# Patient Record
Sex: FEMALE | Race: WHITE | Employment: STUDENT | URBAN - NONMETROPOLITAN AREA
[De-identification: names, ages, dates, MRNs, and addresses within clinical notes are randomized per-mention and may not be internally consistent; named-entity substitution may affect disease eponyms.]

---

## 2017-04-03 ENCOUNTER — TELEPHONE (OUTPATIENT)
Dept: FAMILY MEDICINE CLINIC | Age: 22
End: 2017-04-03

## 2017-09-28 ENCOUNTER — HOSPITAL ENCOUNTER (EMERGENCY)
Age: 22
Discharge: HOME | End: 2017-09-28
Payer: COMMERCIAL

## 2017-09-28 VITALS
TEMPERATURE: 98.3 F | WEIGHT: 172 LBS | DIASTOLIC BLOOD PRESSURE: 84 MMHG | RESPIRATION RATE: 18 BRPM | BODY MASS INDEX: 31.65 KG/M2 | HEART RATE: 97 BPM | SYSTOLIC BLOOD PRESSURE: 126 MMHG | OXYGEN SATURATION: 100 % | HEIGHT: 62 IN

## 2017-09-28 DIAGNOSIS — R51.9 ACUTE NONINTRACTABLE HEADACHE, UNSPECIFIED HEADACHE TYPE: ICD-10-CM

## 2017-09-28 DIAGNOSIS — J06.9 VIRAL URI: Primary | ICD-10-CM

## 2017-09-28 PROCEDURE — 99213 OFFICE O/P EST LOW 20 MIN: CPT | Performed by: NURSE PRACTITIONER

## 2017-09-28 PROCEDURE — 6360000002 HC RX W HCPCS: Performed by: NURSE PRACTITIONER

## 2017-09-28 PROCEDURE — 99214 OFFICE O/P EST MOD 30 MIN: CPT

## 2017-09-28 PROCEDURE — 96372 THER/PROPH/DIAG INJ SC/IM: CPT

## 2017-09-28 RX ORDER — KETOROLAC TROMETHAMINE 30 MG/ML
60 INJECTION, SOLUTION INTRAMUSCULAR; INTRAVENOUS ONCE
Status: COMPLETED | OUTPATIENT
Start: 2017-09-28 | End: 2017-09-28

## 2017-09-28 RX ORDER — ETONOGESTREL AND ETHINYL ESTRADIOL 11.7; 2.7 MG/1; MG/1
1 INSERT, EXTENDED RELEASE VAGINAL SEE ADMIN INSTRUCTIONS
COMMUNITY

## 2017-09-28 RX ORDER — SUMATRIPTAN 25 MG/1
25 TABLET, FILM COATED ORAL
Qty: 30 TABLET | Refills: 0 | Status: SHIPPED | OUTPATIENT
Start: 2017-09-28 | End: 2017-09-28

## 2017-09-28 RX ORDER — FLUTICASONE PROPIONATE 50 MCG
2 SPRAY, SUSPENSION (ML) NASAL DAILY
Qty: 1 BOTTLE | Refills: 0 | Status: SHIPPED | OUTPATIENT
Start: 2017-09-28 | End: 2017-12-30 | Stop reason: ALTCHOICE

## 2017-09-28 RX ORDER — LORATADINE 10 MG/1
10 TABLET ORAL DAILY
Qty: 30 TABLET | Refills: 0 | Status: SHIPPED | OUTPATIENT
Start: 2017-09-28 | End: 2018-02-01

## 2017-09-28 RX ADMIN — KETOROLAC TROMETHAMINE 60 MG: 30 INJECTION, SOLUTION INTRAMUSCULAR at 19:08

## 2017-09-28 ASSESSMENT — ENCOUNTER SYMPTOMS
EYE PAIN: 0
SINUS PRESSURE: 1
SORE THROAT: 1
ABDOMINAL PAIN: 0
COLOR CHANGE: 0
WHEEZING: 0
SHORTNESS OF BREATH: 0
VISUAL CHANGE: 0
RHINORRHEA: 0
PHOTOPHOBIA: 0
COUGH: 0
SNORING: 0
SWOLLEN GLANDS: 0
BLURRED VISION: 0
BACK PAIN: 0
DIARRHEA: 0
NAUSEA: 0
VOMITING: 0
TINGLING: 0
CHEST TIGHTNESS: 0

## 2017-09-28 ASSESSMENT — PAIN DESCRIPTION - PAIN TYPE: TYPE: ACUTE PAIN

## 2017-09-28 ASSESSMENT — PAIN DESCRIPTION - DESCRIPTORS: DESCRIPTORS: HEADACHE

## 2017-09-28 ASSESSMENT — PAIN DESCRIPTION - LOCATION: LOCATION: HEAD

## 2017-09-28 ASSESSMENT — PAIN DESCRIPTION - FREQUENCY: FREQUENCY: CONTINUOUS

## 2017-09-28 ASSESSMENT — PAIN SCALES - GENERAL: PAINLEVEL_OUTOF10: 9

## 2017-10-01 ENCOUNTER — HOSPITAL ENCOUNTER (EMERGENCY)
Age: 22
Discharge: HOME | End: 2017-10-01
Payer: COMMERCIAL

## 2017-10-01 VITALS
WEIGHT: 170 LBS | RESPIRATION RATE: 16 BRPM | HEART RATE: 92 BPM | DIASTOLIC BLOOD PRESSURE: 83 MMHG | BODY MASS INDEX: 31.28 KG/M2 | HEIGHT: 62 IN | SYSTOLIC BLOOD PRESSURE: 153 MMHG | OXYGEN SATURATION: 99 % | TEMPERATURE: 98 F

## 2017-10-01 DIAGNOSIS — J30.9 ALLERGIC SINUSITIS: Primary | ICD-10-CM

## 2017-10-01 PROCEDURE — 99213 OFFICE O/P EST LOW 20 MIN: CPT

## 2017-10-01 RX ORDER — SUMATRIPTAN 25 MG/1
100 TABLET, FILM COATED ORAL
COMMUNITY
End: 2018-02-01 | Stop reason: DRUGHIGH

## 2017-10-01 ASSESSMENT — ENCOUNTER SYMPTOMS
WHEEZING: 0
SNORING: 0
SHORTNESS OF BREATH: 0
NAUSEA: 0
TROUBLE SWALLOWING: 0
RHINORRHEA: 1
COUGH: 0
CHEST TIGHTNESS: 0
SORE THROAT: 0
VOICE CHANGE: 0
HOARSE VOICE: 0
STRIDOR: 0
VOMITING: 0
SWOLLEN GLANDS: 0

## 2017-10-01 ASSESSMENT — PAIN DESCRIPTION - FREQUENCY: FREQUENCY: INTERMITTENT

## 2017-10-01 ASSESSMENT — PAIN DESCRIPTION - LOCATION: LOCATION: HEAD

## 2017-10-01 ASSESSMENT — PAIN DESCRIPTION - DESCRIPTORS: DESCRIPTORS: HEADACHE

## 2017-10-01 ASSESSMENT — PAIN DESCRIPTION - PAIN TYPE: TYPE: ACUTE PAIN

## 2017-10-01 ASSESSMENT — PAIN DESCRIPTION - PROGRESSION: CLINICAL_PROGRESSION: GRADUALLY IMPROVING

## 2017-10-01 ASSESSMENT — PAIN DESCRIPTION - ONSET: ONSET: ON-GOING

## 2017-10-01 ASSESSMENT — PAIN SCALES - GENERAL: PAINLEVEL_OUTOF10: 5

## 2017-10-01 NOTE — ED NOTES
Pt discharged. Discharge assessments completed. No changes. All discharge education and information given. Pt instructed to go to ED for any shortness of breath, chest pain or abd pain. Verbalized understanding. Left stable.      Keller Merlin, LPN  55/66/20 4768

## 2017-10-01 NOTE — ED PROVIDER NOTES
[facility]  Urgent Care Encounter      CHIEF COMPLAINT       Chief Complaint   Patient presents with    Headache    Nausea       Nurses Notes reviewed and I agree except as noted in the HPI. HISTORY OF PRESENT ILLNESS   Jenny Krishna is a 23 y.o. female who presents:  HPI Comments: Was seen on 9-28-17 at urgent care with headache was given toradol with relief and taking imitrex. Symptoms resolved. Yesterday started with congestion , sneezing, rhinorrhea and intermittent headache missed work today requesting work slip. Denies vision changes, vomiting, fever, dizziness, sinus pressure, tooth pain, facial swelling. Started on Flonase and Claritin for symptoms. Patient is a 23 y.o. female presenting with sinusitis. The history is provided by the patient. Sinusitis   Pain details:     Quality:  Pressure and aching (sinus headache)    Pain severity now: 5/10.     Duration:  2 days    Timing:  Intermittent  Duration:  2 days  Progression:  Waxing and waning  Chronicity:  New  Context: allergies    Relieved by:  Spray decongestants (using imitrex, claritin)  Associated symptoms: congestion, headaches, rhinorrhea and sneezing    Associated symptoms: no chest pain, no chills, no cough, no ear pain, no fatigue, no fever, no hoarse voice, no mouth breathing, no nausea, no shortness of breath, no snoring, no sore throat, no swollen glands, no tooth pain, no vertigo, no vomiting and no wheezing    Congestion:     Location:  Nasal    Interferes with sleep: no      Interferes with eating/drinking: no    Headaches:     Severity:  Moderate    Onset quality:  Sudden    Duration:  2 days    Timing:  Intermittent    Progression:  Waxing and waning    Chronicity:  Recurrent (hx of headaches)  Rhinorrhea:     Quality:  Clear    Severity:  Mild    Duration:  2 days    Timing:  Intermittent    Progression:  Unchanged  Risk factors: no asthma    Risk factors comment:  Hx of allergic rhinitis      REVIEW She is alert and oriented to person, place, and time. Skin: Skin is warm, dry and intact. No rash noted. She is not diaphoretic. No cyanosis. No pallor. Nursing note and vitals reviewed. DIAGNOSTIC RESULTS   Labs:No results found for this visit on 10/01/17. IMAGING:    URGENT CARE COURSE:     Vitals:    10/01/17 1549   BP: (!) 153/83   Pulse: 92   Resp: 16   Temp: 98 °F (36.7 °C)   TempSrc: Temporal   SpO2: 99%   Weight: 170 lb (77.1 kg)   Height: 5' 2\" (1.575 m)       Medications - No data to display  PROCEDURES:  None  FINAL IMPRESSION      1. Allergic sinusitis        DISPOSITION/PLAN   DISPOSITION Decision to Discharge   Most sinus infections are viral, and antibiotics do not help. Try meds as ordered for the next few days, and nasal saline spray twice daily. If no improvement, or worsening of symptoms follow up appointment. PATIENT REFERRED TO:  Theresa Beck [id number] [phone]    Call in 1 day  For appointment     ER      If symptoms worsen    Patient instructed to follow up with PCP. If symptoms worsen, become severe or new symptoms develop patient instructed to go to the emergency room immediately. DISCHARGE MEDICATIONS:  Discharge Medication List as of 10/1/2017  4:09 PM        Discharge Medication List as of 10/1/2017  4:09 PM          Patient given educational materials - see patient instructions. Discussed use, benefit, and side effects of prescribed medications. All patient questions answered. Pt voiced understanding. Reviewed health maintenance. Patient agreed with treatment plan. Follow up as directed.      Sudarshan Morris, [address], CNP  10/02/17 2559

## 2017-10-01 NOTE — ED AVS SNAPSHOT
After Visit Summary  (Discharge Instructions)    Medication List for Home    Based on the information you provided to us as well as any changes during this visit, the following is your updated medication list.  Compare this with your prescription bottles at home. If you have any questions or concerns, contact your primary care physician's office.              Daily Medication List (This medication list can be shared with any Healthcare provider who is helping you manage your medications)      These are medications you told us you were taking at home, CONTINUE taking them after you leave the hospital     Biotin 87950 MCG Tbdp   Take by mouth       ferrous sulfate 325 (65 Fe) MG EC tablet   Commonly known as:  FE TABS   Take 1 tablet by mouth 2 times daily       fluticasone 50 MCG/ACT nasal spray   Commonly known as:  FLONASE   2 sprays by Nasal route daily Apply daily to each nare       loratadine 10 MG tablet   Commonly known as:  CLARITIN   Take 1 tablet by mouth daily       NUVARING 0.12-0.015 MG/24HR vaginal ring   Generic drug:  etonogestrel-ethinyl estradiol   Place 1 each vaginally See Admin Instructions       SUMAtriptan 25 MG tablet   Commonly known as:  IMITREX   Take 25 mg by mouth once as needed for Migraine               Allergies as of 10/1/2017        Reactions    Nickel     Biaxin [Clarithromycin] Nausea And Vomiting      Immunizations as of 10/1/2017     Name Date Dose VIS Date Route    DTaP Vaccine 5/14/2002 -- -- --    DTaP Vaccine 10/13/1998 -- -- --    DTaP Vaccine 1/19/1998 -- -- --    DTaP Vaccine 1997 -- -- --    DTaP Vaccine 1997 -- -- --    HIB PRP-T (ActHIB, Hiberix) 10/13/1998 -- -- --    HIB PRP-T (ActHIB, Hiberix) 1/19/1998 -- -- --    HIB PRP-T (ActHIB, Hiberix) 1997 -- -- --    HIB PRP-T (ActHIB, Hiberix) 1997 -- -- --    HPV Gardasil Quadrivalent 12/15/2015 -- -- --    HPV Gardasil Quadrivalent 5/27/2015 -- -- -- HPV Gardasil Quadrivalent 3/30/2015 -- -- --    Hepatitis A 2011 -- -- --    Hepatitis A 2011 -- -- --    Hepatitis B (Engerix-B) 1998 -- -- --    Hepatitis B (Engerix-B) 1997 -- -- --    Hepatitis B (Engerix-B) 1997 -- -- --    IPV (Ipol) 2002 -- -- --    IPV (Ipol) 10/15/1998 -- -- --    IPV (Ipol) 1997 -- -- --    IPV (Ipol) 1997 -- -- --    Influenza, Quadv, 3 Years and older, IM 10/12/2016 0.5 mL 2015 Intramuscular    MMR 2002 -- -- --    MMR 10/13/1998 -- -- --    Meningococcal MCV4P (Menactra) 2014 -- -- --    Meningococcal MCV4P (Menactra) 2010 -- -- --    PPD Test 8/15/2016 0.1 mL -- Subcutaneous    PPD Test 2016 0.1 mL -- Subcutaneous    Tdap (Boostrix, Adacel) 2010 -- -- --    Varicella 2008 -- -- --    Varicella 2002 -- -- --         After Visit Summary    This summary was created for you. Thank you for entrusting your care to us. The following information includes details about your hospital/visit stay along with steps you should take to help with your recovery once you leave the hospital.  In this packet, you will find information about the topics listed below:    · Instructions about your medications including a list of your home medications  · A summary of your hospital visit  · Follow-up appointments once you have left the hospital  · Your care plan at home      You may receive a survey regarding the care you received during your stay. Your input is valuable to us. We encourage you to complete and return your survey in the envelope provided. We hope you will choose us in the future for your healthcare needs.           Patient Information     Patient Name MANJIT Garcia 1995      Care Provided at:     Name Address Phone       [address] [phone]            Your Visit those caring for you know how to best care for your health needs at home. This section may also include educational information about certain health topics that may be of help to you. Important Information if you smoke or are exposed to smoking       SMOKING: QUIT SMOKING. THIS IS THE MOST IMPORTANT ACTION YOU CAN TAKE TO IMPROVE YOUR CURRENT AND FUTURE HEALTH. Call the [facility] ([phone])    Smoking harms nonsmokers. When nonsmokers are around people who smoke, they absorb nicotine, carbon monoxide, and other ingredients of tobacco smoke. DO NOT SMOKE AROUND CHILDREN     Children exposed to secondhand smoke are at an increased risk of:  Sudden Infant Death Syndrome (SIDS), acute respiratory infections, inflammation of the middle ear, and severe asthma. Over a longer time, it causes heart disease and lung cancer. There is no safe level of exposure to secondhand smoke. Open CS Signup     Our records indicate that you have an active Open CS account. You can view your After Visit Summary by going to [email or web address] and logging in with your Open CS username and password. If you don't have a Open CS username and password but a parent or guardian has access to your record, the parent or guardian should login with their own Open CS username and password and access your record to view the After Visit Summary. Additional Information  If you have questions, please contact the physician practice where you receive care. Remember, Open CS is NOT to be used for urgent needs. For medical emergencies, dial 911. For questions regarding your Open CS account call [phone]. If you have a clinical question, please call your doctor's office. View your information online  ? Review your current list of  medications, immunization, and allergies. ? Review your future test results online . ? Review your discharge instructions provided by your caregivers at discharge    Certain functionality such as prescription refills, scheduling appointments or sending messages to your provider are not activated if your provider does not use [facility] in his/her office    For questions regarding your MyChart account call [phone]. If you have a clinical question, please call your doctor's office. The information on all pages of the After Visit Summary has been reviewed with me, the patient and/or responsible adult, by my health care provider(s). I had the opportunity to ask questions regarding this information. I understand I should dispose of my armband safely at home to protect my health information. A complete copy of the After Visit Summary has been given to me, the patient and/or responsible adult. Patient Signature/Responsible Adult: ___________________________________    Nurse Signature: ___________________________________  Resident/MLP Signature: ___________________________________  Attending Signature: ___________________________________    Date:____________Time:____________              Discharge Instructions            Allergies: Care Instructions  Your Care Instructions  Allergies occur when your body's defense system (immune system) overreacts to certain substances. The immune system treats a harmless substance as if it were a harmful germ or virus. Many things can cause this overreaction, including pollens, medicine, food, dust, animal dander, and mold. Allergies can be mild or severe. Mild allergies can be managed with home treatment. But medicine may be needed to prevent problems. Managing your allergies is an important part of staying healthy. Your doctor may suggest that you have allergy testing to help find out what is causing your allergies. When you know what things trigger your symptoms, you can avoid them. This can prevent allergy symptoms and other health problems. For severe allergies that cause reactions that affect your whole body (anaphylactic reactions), your doctor may prescribe a shot of epinephrine to carry with you in case you have a severe reaction. Learn how to give yourself the shot and keep it with you at all times. Make sure it is not . Follow-up care is a key part of your treatment and safety. Be sure to make and go to all appointments, and call your doctor if you are having problems. It's also a good idea to know your test results and keep a list of the medicines you take. How can you care for yourself at home? · If you have been told by your doctor that dust or dust mites are causing your allergy, decrease the dust around your bed:  [employer] sheets, pillowcases, and other bedding in hot water every week. ¨ Use dust-proof covers for pillows, duvets, and mattresses. Avoid plastic covers because they tear easily and do not \"breathe. \" Wash as instructed on the label. ¨ Do not use any blankets and pillows that you do not need. ¨ Use blankets that you can wash in your washing machine. ¨ Consider removing drapes and carpets, which attract and hold dust, from your bedroom. · If you are allergic to house dust and mites, do not use home humidifiers. Your doctor can suggest ways you can control dust and mites. · Look for signs of cockroaches. Cockroaches cause allergic reactions. Use cockroach baits to get rid of them. Then, clean your home well. Cockroaches like areas where grocery bags, newspapers, empty bottles, or cardboard boxes are stored. Do not keep these inside your home, and keep trash and food containers sealed. Seal off any spots where cockroaches might enter your home. · If you are allergic to mold, get rid of furniture, rugs, and drapes that smell musty. Check for mold in the bathroom. · If you are allergic to outdoor pollen or mold spores, use air-conditioning. Change or clean all filters every month. Keep windows closed. · If you are allergic to pollen, stay inside when pollen counts are high. Use a vacuum  with a HEPA filter or a double-thickness filter at least two times each week. · Stay inside when air pollution is bad. Avoid paint fumes, perfumes, and other strong odors. · Avoid conditions that make your allergies worse. Stay away from smoke. Do not smoke or let anyone else smoke in your house. Do not use fireplaces or wood-burning stoves. · If you are allergic to your pets, change the air filter in your furnace every month. Use high-efficiency filters. · If you are allergic to pet dander, keep pets outside or out of your bedroom. Old carpet and cloth furniture can hold a lot of animal dander. You may need to replace them. When should you call for help? Give an epinephrine shot if:  · You think you are having a severe allergic reaction. · You have symptoms in more than one body area, such as mild nausea and an itchy mouth. After giving an epinephrine shot call 911, even if you feel better. Call 911 if:  · You have symptoms of a severe allergic reaction. These may include:  ¨ Sudden raised, red areas (hives) all over your body. ¨ Swelling of the throat, mouth, lips, or tongue. ¨ Trouble breathing. ¨ Passing out (losing consciousness). Or you may feel very lightheaded or suddenly feel weak, confused, or restless. · You have been given an epinephrine shot, even if you feel better. Call your doctor now or seek immediate medical care if:  · You have symptoms of an allergic reaction, such as:  ¨ A rash or hives (raised, red areas on the skin). ¨ Itching. ¨ Swelling. ¨ Belly pain, nausea, or vomiting. Watch closely for changes in your health, and be sure to contact your doctor if:  · You do not get better as expected. Where can you learn more? Go to [email or web address] [email or web address] and sign in to your Prism Digital account.  Enter I694 in the Pawngo box to learn more about \"Allergies: Care Instructions. \"     If you do not have an account, please click on the \"Sign Up Now\" link. Current as of: April 3, 2017  Content Version: 11.3  © 9931-4290 [employer]. Care instructions adapted under license by [facility] ([facility]). If you have questions about a medical condition or this instruction, always ask your healthcare professional. [address] any warranty or liability for your use of this information. Seasonal Allergies: Care Instructions  Your Care Instructions  Allergies occur when your body's defense system (immune system) overreacts to certain substances. The immune system treats a harmless substance as if it were a harmful germ or virus. Many things can cause this to happen. Examples include pollens, medicine, food, dust, animal dander, and mold. Your allergies are seasonal if you have symptoms just at certain times of the year. In that case, you are probably allergic to pollens from certain trees, grasses, or weeds. Allergies can be mild or severe. Over-the-counter allergy medicine may help with some symptoms. Read and follow all instructions on the label. Managing your allergies is an important part of staying healthy. Your doctor may suggest that you have tests to help find the cause of your allergies. When you know what things trigger your symptoms, you can avoid them. This can prevent allergy symptoms and other health problems. In some cases, immunotherapy might help. For this treatment, you get shots or use pills that have a small amount of certain allergens in them. Your body \"gets used to\" the allergen, so you react less to it over time. This kind of treatment may help prevent or reduce some allergy symptoms. Follow-up care is a key part of your treatment and safety. Be sure to make and go to all appointments, and call your doctor if you are having problems.  It's also a good idea to know your test results and keep a list of the medicines you take. How can you care for yourself at home? · Be safe with medicines. Take your medicines exactly as prescribed. Call your doctor if you think you are having a problem with your medicine. · During your allergy season, keep windows closed. If you need to use air-conditioning, change or clean all filters every month. Take a shower and change your clothes after you have been outside. · Stay inside when pollen counts are high. Vacuum once or twice a week. Use a vacuum  with a HEPA filter or a double-thickness filter. When should you call for help? Give an epinephrine shot if:  · You think you are having a severe allergic reaction. After giving an epinephrine shot, call 911, even if you feel better. Call 911 if:  · You have symptoms of a severe allergic reaction. These may include:  ¨ Sudden raised, red areas (hives) all over your body. ¨ Swelling of the throat, mouth, lips, or tongue. ¨ Trouble breathing. ¨ Passing out (losing consciousness). Or you may feel very lightheaded or suddenly feel weak, confused, or restless. · You have been given an epinephrine shot, even if you feel better. Call your doctor now or seek immediate medical care if:  · You have symptoms of an allergic reaction, such as:  ¨ A rash or hives (raised, red areas on the skin). ¨ Itching. ¨ Swelling. ¨ Belly pain, nausea, or vomiting. Watch closely for changes in your health, and be sure to contact your doctor if:  · You do not get better as expected. Where can you learn more? Go to [email or web address]://BidModopoppy.Basewin [email or web address] org and sign in to your Scooters account. Enter J912 in the Stitch Labs box to learn more about \"Seasonal Allergies: Care Instructions. \"     If you do not have an account, please click on the \"Sign Up Now\" link. Current as of: September 29, 2016  Content Version: 11.3  © 0653-4803 [employer]riously[employer].  Care instructions adapted under open and wash out mucus and bacteria. You can buy saline nose drops at a grocery store or drugstore. Or you can make your own at home by adding 1 teaspoon of salt and 1 teaspoon of baking soda to 2 cups of distilled water. If you make your own, fill a bulb syringe with the solution, insert the tip into your nostril, and squeeze gently. Ethelda Aretha your nose. When should you call for help? Call your doctor now or seek immediate medical care if:  · You are having trouble breathing. Watch closely for changes in your health, and be sure to contact your doctor if:  · Mucus from your nose gets thicker (like pus) or has new blood in it. · You have new or worse symptoms. · You do not get better as expected. Where can you learn more? Go to [email or web address]://Second Chance StaffingpeEgeneraeb.[email or web address] org and sign in to your ProTenders account. Enter M030 in the BusinessElite box to learn more about \"Rhinitis: Care Instructions. \"     If you do not have an account, please click on the \"Sign Up Now\" link. Current as of: July 29, 2016  Content Version: 11.3  © 1547-1617 [employer]ef[employer], [employer]. Care instructions adapted under license by [facility] ([facility]). If you have questions about a medical condition or this instruction, always ask your healthcare professional. [address] any warranty or liability for your use of this information.

## 2017-10-02 ASSESSMENT — ENCOUNTER SYMPTOMS: SINUS PRESSURE: 0

## 2017-10-21 ENCOUNTER — HOSPITAL ENCOUNTER (EMERGENCY)
Age: 22
Discharge: HOME | End: 2017-10-21
Payer: COMMERCIAL

## 2017-10-21 VITALS
HEIGHT: 62 IN | TEMPERATURE: 98.4 F | HEART RATE: 93 BPM | RESPIRATION RATE: 16 BRPM | WEIGHT: 175 LBS | OXYGEN SATURATION: 96 % | SYSTOLIC BLOOD PRESSURE: 134 MMHG | DIASTOLIC BLOOD PRESSURE: 73 MMHG | BODY MASS INDEX: 32.2 KG/M2

## 2017-10-21 DIAGNOSIS — J02.9 ACUTE PHARYNGITIS, UNSPECIFIED ETIOLOGY: Primary | ICD-10-CM

## 2017-10-21 LAB
GROUP A STREP CULTURE, REFLEX: NEGATIVE
REFLEX THROAT C + S: NORMAL

## 2017-10-21 PROCEDURE — 99213 OFFICE O/P EST LOW 20 MIN: CPT | Performed by: NURSE PRACTITIONER

## 2017-10-21 PROCEDURE — 87880 STREP A ASSAY W/OPTIC: CPT

## 2017-10-21 PROCEDURE — 87070 CULTURE OTHR SPECIMN AEROBIC: CPT

## 2017-10-21 PROCEDURE — 99215 OFFICE O/P EST HI 40 MIN: CPT

## 2017-10-21 RX ORDER — SACCHAROMYCES BOULARDII 250 MG
250 CAPSULE ORAL 2 TIMES DAILY
Qty: 14 CAPSULE | Refills: 0 | Status: SHIPPED | OUTPATIENT
Start: 2017-10-21 | End: 2017-10-28

## 2017-10-21 RX ORDER — AMOXICILLIN 500 MG/1
500 CAPSULE ORAL 2 TIMES DAILY
Qty: 20 CAPSULE | Refills: 0 | Status: SHIPPED | OUTPATIENT
Start: 2017-10-21 | End: 2017-10-31

## 2017-10-21 ASSESSMENT — ENCOUNTER SYMPTOMS
SINUS PRESSURE: 0
CHEST TIGHTNESS: 0
SORE THROAT: 1
DIARRHEA: 0
NAUSEA: 1
SHORTNESS OF BREATH: 0
ABDOMINAL PAIN: 0
RHINORRHEA: 0
VOMITING: 0

## 2017-10-21 ASSESSMENT — PAIN SCALES - GENERAL: PAINLEVEL_OUTOF10: 3

## 2017-10-21 ASSESSMENT — PAIN DESCRIPTION - LOCATION: LOCATION: THROAT

## 2017-10-21 NOTE — ED NOTES
Pt discharged. Discharge assessments completed. No changes. All discharge education and information given. Pt instructed to go to ED for any shortness of breath, chest pain or abd pain. Verbalized understanding. Left stable.      Dash Staff, LORENZAN  24/39/04 5806

## 2017-10-21 NOTE — ED PROVIDER NOTES
SUMATRIPTAN (IMITREX) 25 MG TABLET    Take 100 mg by mouth once as needed for Migraine        ALLERGIES     Patient is is allergic to nickel and biaxin [clarithromycin]. FAMILY HISTORY     Patient's family history includes High Blood Pressure in her father. SOCIAL HISTORY     Patient  reports that she has never smoked. She has never used smokeless tobacco. She reports that she drinks alcohol. She reports that she does not use drugs. PHYSICAL EXAM     ED TRIAGE VITALS  BP: 134/73, Temp: 98.4 °F (36.9 °C), Pulse: 93, Resp: 16, SpO2: 96 %  Physical Exam   Constitutional: She is oriented to person, place, and time. Vital signs are normal. She appears well-developed and well-nourished. She is cooperative. Non-toxic appearance. She does not have a sickly appearance. She does not appear ill. No distress. HENT:   Head: Normocephalic and atraumatic. Right Ear: Hearing, tympanic membrane, external ear and ear canal normal. No drainage, swelling or tenderness. No mastoid tenderness. Tympanic membrane is not erythematous and not bulging. Left Ear: Hearing, tympanic membrane, external ear and ear canal normal. No drainage, swelling or tenderness. No mastoid tenderness. Tympanic membrane is not erythematous and not bulging. Nose: Nose normal. No mucosal edema or rhinorrhea. Right sinus exhibits no maxillary sinus tenderness and no frontal sinus tenderness. Left sinus exhibits no maxillary sinus tenderness and no frontal sinus tenderness. Mouth/Throat: Uvula is midline and mucous membranes are normal. No oral lesions. No uvula swelling. Posterior oropharyngeal edema and posterior oropharyngeal erythema present. No oropharyngeal exudate or tonsillar abscesses. Eyes: Conjunctivae are normal.   Neck: Normal range of motion and full passive range of motion without pain. No neck rigidity. Cardiovascular: Normal rate. Pulmonary/Chest: Effort normal and breath sounds normal. No accessory muscle usage.  No

## 2017-10-23 LAB — THROAT/NOSE CULTURE: NORMAL

## 2017-12-30 ENCOUNTER — HOSPITAL ENCOUNTER (EMERGENCY)
Age: 22
Discharge: HOME | End: 2017-12-30
Payer: COMMERCIAL

## 2017-12-30 VITALS
TEMPERATURE: 97.7 F | BODY MASS INDEX: 32.01 KG/M2 | RESPIRATION RATE: 18 BRPM | SYSTOLIC BLOOD PRESSURE: 140 MMHG | OXYGEN SATURATION: 99 % | WEIGHT: 175 LBS | HEART RATE: 78 BPM | DIASTOLIC BLOOD PRESSURE: 77 MMHG

## 2017-12-30 DIAGNOSIS — J01.90 ACUTE RHINOSINUSITIS: ICD-10-CM

## 2017-12-30 DIAGNOSIS — G43.109 MIGRAINE WITH AURA AND WITHOUT STATUS MIGRAINOSUS, NOT INTRACTABLE: Primary | ICD-10-CM

## 2017-12-30 PROCEDURE — 96372 THER/PROPH/DIAG INJ SC/IM: CPT

## 2017-12-30 PROCEDURE — 99212 OFFICE O/P EST SF 10 MIN: CPT

## 2017-12-30 PROCEDURE — 6360000002 HC RX W HCPCS: Performed by: NURSE PRACTITIONER

## 2017-12-30 PROCEDURE — 99214 OFFICE O/P EST MOD 30 MIN: CPT | Performed by: NURSE PRACTITIONER

## 2017-12-30 RX ORDER — FLUTICASONE PROPIONATE 50 MCG
1 SPRAY, SUSPENSION (ML) NASAL DAILY
Qty: 1 BOTTLE | Refills: 0 | Status: SHIPPED | OUTPATIENT
Start: 2017-12-30 | End: 2018-02-01

## 2017-12-30 RX ORDER — KETOROLAC TROMETHAMINE 30 MG/ML
60 INJECTION, SOLUTION INTRAMUSCULAR; INTRAVENOUS ONCE
Status: COMPLETED | OUTPATIENT
Start: 2017-12-30 | End: 2017-12-30

## 2017-12-30 RX ORDER — ACETAMINOPHEN, ASPIRIN AND CAFFEINE 250; 250; 65 MG/1; MG/1; MG/1
1 TABLET, FILM COATED ORAL EVERY 6 HOURS PRN
Qty: 90 TABLET | Refills: 3 | Status: SHIPPED | OUTPATIENT
Start: 2017-12-30

## 2017-12-30 RX ORDER — ONDANSETRON 4 MG/1
4 TABLET, ORALLY DISINTEGRATING ORAL ONCE
Status: COMPLETED | OUTPATIENT
Start: 2017-12-30 | End: 2017-12-30

## 2017-12-30 RX ORDER — ONDANSETRON 4 MG/1
4 TABLET, FILM COATED ORAL EVERY 8 HOURS PRN
Qty: 15 TABLET | Refills: 0 | Status: SHIPPED | OUTPATIENT
Start: 2017-12-30 | End: 2019-06-19

## 2017-12-30 RX ADMIN — ONDANSETRON 4 MG: 4 TABLET, ORALLY DISINTEGRATING ORAL at 14:43

## 2017-12-30 RX ADMIN — KETOROLAC TROMETHAMINE 60 MG: 30 INJECTION, SOLUTION INTRAMUSCULAR at 14:44

## 2017-12-30 ASSESSMENT — ENCOUNTER SYMPTOMS
SINUS PRESSURE: 0
ABDOMINAL PAIN: 0
EYE PAIN: 0
COUGH: 0
EYE REDNESS: 0
PHOTOPHOBIA: 1
TROUBLE SWALLOWING: 0
VOICE CHANGE: 0
STRIDOR: 0
NAUSEA: 0
EYE DISCHARGE: 0
EYE ITCHING: 0
CHEST TIGHTNESS: 0
SORE THROAT: 0
BLURRED VISION: 0
VISUAL CHANGE: 0
RHINORRHEA: 0
SWOLLEN GLANDS: 0
WHEEZING: 0
VOMITING: 0
SHORTNESS OF BREATH: 0
TINGLING: 0

## 2017-12-30 ASSESSMENT — PAIN SCALES - GENERAL: PAINLEVEL_OUTOF10: 1

## 2018-01-07 ENCOUNTER — HOSPITAL ENCOUNTER (EMERGENCY)
Age: 23
Discharge: HOME | End: 2018-01-07
Payer: COMMERCIAL

## 2018-01-07 VITALS
BODY MASS INDEX: 32.2 KG/M2 | DIASTOLIC BLOOD PRESSURE: 73 MMHG | SYSTOLIC BLOOD PRESSURE: 130 MMHG | TEMPERATURE: 98.4 F | OXYGEN SATURATION: 100 % | WEIGHT: 175 LBS | HEIGHT: 62 IN | HEART RATE: 111 BPM

## 2018-01-07 DIAGNOSIS — J02.8 ACUTE PHARYNGITIS DUE TO OTHER SPECIFIED ORGANISMS: Primary | ICD-10-CM

## 2018-01-07 PROCEDURE — 99212 OFFICE O/P EST SF 10 MIN: CPT

## 2018-01-07 PROCEDURE — 99213 OFFICE O/P EST LOW 20 MIN: CPT | Performed by: NURSE PRACTITIONER

## 2018-01-07 RX ORDER — FERROUS SULFATE 325(65) MG
325 TABLET ORAL
COMMUNITY

## 2018-01-07 RX ORDER — AMOXICILLIN 250 MG/5ML
1000 POWDER, FOR SUSPENSION ORAL 2 TIMES DAILY
Qty: 400 ML | Refills: 0 | Status: SHIPPED | OUTPATIENT
Start: 2018-01-07 | End: 2018-01-17

## 2018-01-07 ASSESSMENT — ENCOUNTER SYMPTOMS
VOMITING: 0
EYE PAIN: 0
COUGH: 0
SHORTNESS OF BREATH: 0
RHINORRHEA: 0
WHEEZING: 0
SINUS PRESSURE: 0
TROUBLE SWALLOWING: 1
EYE REDNESS: 0
NAUSEA: 0
EYE DISCHARGE: 0
CHEST TIGHTNESS: 0
ABDOMINAL PAIN: 0
SORE THROAT: 1
BACK PAIN: 0
CONSTIPATION: 0
EYE ITCHING: 0
DIARRHEA: 0

## 2018-01-07 ASSESSMENT — PAIN DESCRIPTION - ORIENTATION: ORIENTATION: RIGHT;LEFT

## 2018-01-07 ASSESSMENT — PAIN SCALES - GENERAL: PAINLEVEL_OUTOF10: 8

## 2018-01-07 ASSESSMENT — PAIN DESCRIPTION - LOCATION: LOCATION: EAR;THROAT

## 2018-01-07 NOTE — ED PROVIDER NOTES
Genitourinary: Negative for dysuria, flank pain, frequency and hematuria. Musculoskeletal: Positive for myalgias and neck pain (feels like glands are swollen). Negative for arthralgias, back pain and joint swelling. Skin: Negative. Neurological: Positive for headaches. Negative for dizziness and light-headedness. Hematological: Negative. PAST MEDICAL HISTORY         Diagnosis Date    Allergic rhinitis     Anemia     Headache        SURGICAL HISTORY     Patient  has a past surgical history that includes [place] tooth extraction; Tympanostomy tube placement; Tear duct surgery; and Dilation and curettage of uterus. CURRENT MEDICATIONS       Discharge Medication List as of 1/7/2018  6:15 PM      CONTINUE these medications which have NOT CHANGED    Details   ferrous sulfate 325 (65 Fe) MG tablet Take 325 mg by mouth daily (with breakfast)Historical Med      aspirin-acetaminophen-caffeine (EXCEDRIN MIGRAINE) 250-250-65 MG per tablet Take 1 tablet by mouth every 6 hours as needed for Headaches, Disp-90 tablet, R-3Normal      ondansetron (ZOFRAN) 4 MG tablet Take 1 tablet by mouth every 8 hours as needed for Nausea or Vomiting, Disp-15 tablet, R-0Normal      fluticasone (FLONASE) 50 MCG/ACT nasal spray 1 spray by Nasal route daily Apply daily to each nare, Disp-1 Bottle, R-0Normal      SUMAtriptan (IMITREX) 25 MG tablet Take 100 mg by mouth once as needed for Migraine Historical Med      Biotin 84382 MCG TBDP Take by mouthHistorical Med      etonogestrel-ethinyl estradiol (NUVARING) 0.12-0.015 MG/24HR vaginal ring Place 1 each vaginally See Admin InstructionsHistorical Med      loratadine (CLARITIN) 10 MG tablet Take 1 tablet by mouth daily, Disp-30 tablet, R-0Normal             ALLERGIES     Patient is is allergic to nickel and biaxin [clarithromycin]. FAMILY HISTORY     Patient's family history includes High Blood Pressure in her father.     SOCIAL HISTORY     Patient  reports that she has never IMPRESSION      1. Acute pharyngitis due to other specified organisms        DISPOSITION/PLAN   DISPOSITION Decision To Discharge 01/07/2018 06:14:20 PM  Evaluation for acute pharyngitis. Bilateral tonsils are +3 with exudate and petechiae. Patient will be started on amoxicillin to take as directed. She can also take Tylenol or Motrin for pain and fever as needed. Do salt water gargles. Warm tea with honey. Follow-up with her family physician if symptoms improve within one to 2 days. Patient given educational materials - see patient instructions. Discussed use, benefit, and side effects of prescribed medications. All patient questions answered. Pt voiced understanding. Patient agreed with treatment plan.  Follow up as directed.        PATIENT REFERRED TO:  [address]  [address] [facility]. [address]  [phone]    In 2 days  If symptoms worsen    DISCHARGE MEDICATIONS:  Discharge Medication List as of 1/7/2018  6:15 PM      START taking these medications    Details   amoxicillin (AMOXIL) 250 MG/5ML suspension Take 20 mLs by mouth 2 times daily for 10 days, Disp-400 mL, R-0Normal           Discharge Medication List as of 1/7/2018  6:15 PM          Bonilla Hinojosa, CNP  01/07/18 9728

## 2018-01-20 ENCOUNTER — HOSPITAL ENCOUNTER (EMERGENCY)
Age: 23
Discharge: HOME | End: 2018-01-20
Payer: COMMERCIAL

## 2018-01-20 VITALS
RESPIRATION RATE: 18 BRPM | TEMPERATURE: 99.1 F | OXYGEN SATURATION: 99 % | HEIGHT: 62 IN | BODY MASS INDEX: 32.2 KG/M2 | DIASTOLIC BLOOD PRESSURE: 68 MMHG | HEART RATE: 89 BPM | WEIGHT: 175 LBS | SYSTOLIC BLOOD PRESSURE: 122 MMHG

## 2018-01-20 DIAGNOSIS — G43.809 OTHER MIGRAINE WITHOUT STATUS MIGRAINOSUS, NOT INTRACTABLE: Primary | ICD-10-CM

## 2018-01-20 PROCEDURE — 6360000002 HC RX W HCPCS: Performed by: NURSE PRACTITIONER

## 2018-01-20 PROCEDURE — 99213 OFFICE O/P EST LOW 20 MIN: CPT | Performed by: NURSE PRACTITIONER

## 2018-01-20 PROCEDURE — 99214 OFFICE O/P EST MOD 30 MIN: CPT

## 2018-01-20 PROCEDURE — 96372 THER/PROPH/DIAG INJ SC/IM: CPT

## 2018-01-20 RX ORDER — KETOROLAC TROMETHAMINE 30 MG/ML
60 INJECTION, SOLUTION INTRAMUSCULAR; INTRAVENOUS ONCE
Status: COMPLETED | OUTPATIENT
Start: 2018-01-20 | End: 2018-01-20

## 2018-01-20 RX ADMIN — KETOROLAC TROMETHAMINE 60 MG: 30 INJECTION, SOLUTION INTRAMUSCULAR at 19:08

## 2018-01-20 ASSESSMENT — ENCOUNTER SYMPTOMS
CHEST TIGHTNESS: 0
PHOTOPHOBIA: 0
VOMITING: 0
TINGLING: 0
BLURRED VISION: 0
ABDOMINAL PAIN: 0
SHORTNESS OF BREATH: 0
VISUAL CHANGE: 0
SINUS PAIN: 0
NAUSEA: 0

## 2018-01-20 ASSESSMENT — PAIN DESCRIPTION - PAIN TYPE: TYPE: ACUTE PAIN

## 2018-01-20 ASSESSMENT — PAIN DESCRIPTION - LOCATION: LOCATION: HEAD

## 2018-01-20 ASSESSMENT — PAIN DESCRIPTION - FREQUENCY: FREQUENCY: CONTINUOUS

## 2018-01-20 ASSESSMENT — PAIN SCALES - GENERAL
PAINLEVEL_OUTOF10: 8
PAINLEVEL_OUTOF10: 8

## 2018-01-20 ASSESSMENT — PAIN DESCRIPTION - DESCRIPTORS: DESCRIPTORS: ACHING

## 2018-01-20 NOTE — ED PROVIDER NOTES
SURGICAL HISTORY     Patient  has a past surgical history that includes [place] tooth extraction; Tympanostomy tube placement; Tear duct surgery; and Dilation and curettage of uterus. CURRENT MEDICATIONS       Previous Medications    ASPIRIN-ACETAMINOPHEN-CAFFEINE (EXCEDRIN MIGRAINE) 250-250-65 MG PER TABLET    Take 1 tablet by mouth every 6 hours as needed for Headaches    BIOTIN 78085 MCG TBDP    Take by mouth    ETONOGESTREL-ETHINYL ESTRADIOL (NUVARING) 0.12-0.015 MG/24HR VAGINAL RING    Place 1 each vaginally See Admin Instructions    FERROUS SULFATE 325 (65 FE) MG TABLET    Take 325 mg by mouth daily (with breakfast)    FLUTICASONE (FLONASE) 50 MCG/ACT NASAL SPRAY    1 spray by Nasal route daily Apply daily to each nare    LORATADINE (CLARITIN) 10 MG TABLET    Take 1 tablet by mouth daily    ONDANSETRON (ZOFRAN) 4 MG TABLET    Take 1 tablet by mouth every 8 hours as needed for Nausea or Vomiting    SUMATRIPTAN (IMITREX) 25 MG TABLET    Take 100 mg by mouth once as needed for Migraine        ALLERGIES     Patient is is allergic to nickel and biaxin [clarithromycin]. FAMILY HISTORY     Patient's family history includes High Blood Pressure in her father. SOCIAL HISTORY     Patient  reports that she has never smoked. She has never used smokeless tobacco. She reports that she drinks alcohol. She reports that she does not use drugs. PHYSICAL EXAM     ED TRIAGE VITALS  BP: 122/68, Temp: 99.1 °F (37.3 °C), Pulse: 89, Resp: 18, SpO2: 99 %  Physical Exam   Constitutional: She is oriented to person, place, and time. Vital signs are normal. She appears well-developed and well-nourished. She is cooperative. Non-toxic appearance. She does not have a sickly appearance. She does not appear ill. No distress. HENT:   Head: Normocephalic and atraumatic.    Right Ear: External ear normal.   Left Ear: External ear normal.   Nose: Nose normal.   Mouth/Throat: Mucous membranes are normal.   Eyes: Conjunctivae and EOM are normal. Pupils are equal, round, and reactive to light. Neck: Normal range of motion and full passive range of motion without pain. No neck rigidity. Cardiovascular: Normal rate. Pulmonary/Chest: Effort normal. No accessory muscle usage. No respiratory distress. Neurological: She is alert and oriented to person, place, and time. No cranial nerve deficit. GCS eye subscore is 4. GCS verbal subscore is 5. GCS motor subscore is 6. Skin: Skin is warm and dry. No rash (on exposed surfaces) noted. She is not diaphoretic. Psychiatric: She has a normal mood and affect. Her speech is normal.   Nursing note and vitals reviewed. DIAGNOSTIC RESULTS   Labs:No results found for this visit on 01/20/18. IMAGING:  No orders to display     URGENT CARE COURSE:     Vitals:    01/20/18 1843   BP: 122/68   Pulse: 89   Resp: 18   Temp: 99.1 °F (37.3 °C)   TempSrc: Temporal   SpO2: 99%   Weight: 175 lb (79.4 kg)   Height: 5' 2\" (1.575 m)       Medications   ketorolac (TORADOL) injection 60 mg (60 mg Intramuscular Given 1/20/18 1908)     PROCEDURES:  None  FINAL IMPRESSION      1. Other migraine without status migrainosus, not intractable        DISPOSITION/PLAN   DISPOSITION    Discharge   Medications as directed. Differential diagnosis(s) discussed with patient/patient representative. Home care/self care instructions reviewed with patient/patient representative. Patient is to follow-up with family care provider in 2-3 days if no improvement. Patient is to go to the emergency department if symptoms worsen. Patient/patient representative is aware of care plan, questions answered, verbalizes understanding and is in agreement. Teach back method used for patient/patient representative teaching(s) and printed instructions attached to after visit summary.     PATIENT REFERRED TO:  Jermaine Friedman [address]  [phone]    Schedule an appointment as soon as possible for a visit in 1 week  for further evalation, As needed, If symptoms worsen, GO DIRECTLY TO [address] [place] [address] Urgent Care  [address] [facility]  [phone]    As needed, If symptoms worsen, GO DIRECTLY TO THE EMERGENCY DEPARTMENT    DISCHARGE MEDICATIONS:  New Prescriptions    No medications on file     Current Discharge Medication List          Luciana Shelby, [address] [facility], [facility]  01/20/18 7600

## 2018-01-20 NOTE — LETTER
[facility]  [address] [facility] [place] [address]  Phone: [phone]               January 20, 2018    Patient: March Dunn   YOB: 1995   Date of Visit: 1/20/2018       To Whom It May Concern:    Jared Reyes was seen and treated in our emergency department on 1/20/2018.        Sincerely,       Dione Antonio RN         Signature:__________________________________

## 2018-02-01 ENCOUNTER — OFFICE VISIT (OUTPATIENT)
Dept: FAMILY MEDICINE CLINIC | Age: 23
End: 2018-02-01
Payer: COMMERCIAL

## 2018-02-01 VITALS
HEIGHT: 62 IN | WEIGHT: 165.2 LBS | RESPIRATION RATE: 14 BRPM | SYSTOLIC BLOOD PRESSURE: 124 MMHG | DIASTOLIC BLOOD PRESSURE: 84 MMHG | HEART RATE: 90 BPM | BODY MASS INDEX: 30.4 KG/M2 | TEMPERATURE: 98.3 F

## 2018-02-01 DIAGNOSIS — G43.709 CHRONIC MIGRAINE WITHOUT AURA WITHOUT STATUS MIGRAINOSUS, NOT INTRACTABLE: Primary | ICD-10-CM

## 2018-02-01 DIAGNOSIS — Z23 NEED FOR INFLUENZA VACCINATION: ICD-10-CM

## 2018-02-01 PROCEDURE — 99213 OFFICE O/P EST LOW 20 MIN: CPT | Performed by: FAMILY MEDICINE

## 2018-02-01 PROCEDURE — 1036F TOBACCO NON-USER: CPT | Performed by: FAMILY MEDICINE

## 2018-02-01 PROCEDURE — G8427 DOCREV CUR MEDS BY ELIG CLIN: HCPCS | Performed by: FAMILY MEDICINE

## 2018-02-01 PROCEDURE — 90471 IMMUNIZATION ADMIN: CPT | Performed by: FAMILY MEDICINE

## 2018-02-01 PROCEDURE — G8484 FLU IMMUNIZE NO ADMIN: HCPCS | Performed by: FAMILY MEDICINE

## 2018-02-01 PROCEDURE — G8417 CALC BMI ABV UP PARAM F/U: HCPCS | Performed by: FAMILY MEDICINE

## 2018-02-01 PROCEDURE — 90686 IIV4 VACC NO PRSV 0.5 ML IM: CPT | Performed by: FAMILY MEDICINE

## 2018-02-01 RX ORDER — PROPRANOLOL HYDROCHLORIDE 80 MG/1
80 CAPSULE, EXTENDED RELEASE ORAL DAILY
Qty: 30 CAPSULE | Refills: 3 | Status: SHIPPED | OUTPATIENT
Start: 2018-02-01 | End: 2019-06-19

## 2018-02-01 RX ORDER — SUMATRIPTAN 100 MG/1
TABLET, FILM COATED ORAL
Qty: 9 TABLET | Refills: 3 | Status: SHIPPED | OUTPATIENT
Start: 2018-02-01 | End: 2019-06-19

## 2018-02-01 ASSESSMENT — PATIENT HEALTH QUESTIONNAIRE - PHQ9
SUM OF ALL RESPONSES TO PHQ9 QUESTIONS 1 & 2: 0
2. FEELING DOWN, DEPRESSED OR HOPELESS: 0
SUM OF ALL RESPONSES TO PHQ QUESTIONS 1-9: 0
1. LITTLE INTEREST OR PLEASURE IN DOING THINGS: 0

## 2018-02-01 NOTE — PROGRESS NOTES
After obtaining consent, and per orders of Dr. Kenny Munson, injection of Fluzone 0.5 mL IM given in Left deltoid by Alondra Kwon. Patient instructed to remain in clinic for 20 minutes afterwards, and to report any adverse reaction to me immediately.

## 2018-06-14 ENCOUNTER — TELEPHONE (OUTPATIENT)
Dept: FAMILY MEDICINE CLINIC | Age: 23
End: 2018-06-14

## 2018-06-14 DIAGNOSIS — G43.709 CHRONIC MIGRAINE WITHOUT AURA WITHOUT STATUS MIGRAINOSUS, NOT INTRACTABLE: Primary | Chronic | ICD-10-CM

## 2018-12-13 ENCOUNTER — TELEPHONE (OUTPATIENT)
Dept: FAMILY MEDICINE CLINIC | Age: 23
End: 2018-12-13

## 2018-12-13 DIAGNOSIS — Z86.39 HISTORY OF THYROID DISEASE: ICD-10-CM

## 2018-12-13 DIAGNOSIS — D50.0 IRON DEFICIENCY ANEMIA DUE TO CHRONIC BLOOD LOSS: Primary | ICD-10-CM

## 2018-12-13 NOTE — TELEPHONE ENCOUNTER
Pt called stating she was needing yearly lab orders to have completed for her Iron and Thyroid. Pt would like lab orders faxed to Bernice Russo on [address] @ [phone]. Please advise.

## 2018-12-15 LAB
ABSOLUTE BASO #: 0.1 K/UL (ref 0–0.1)
ABSOLUTE EOS #: 0.2 K/UL (ref 0.1–0.4)
ABSOLUTE LYMPH #: 2.1 K/UL (ref 0.8–5.2)
ABSOLUTE MONO #: 0.4 K/UL (ref 0.1–0.9)
ABSOLUTE NEUT #: 6 K/UL (ref 1.3–9.1)
BASOPHILS RELATIVE PERCENT: 1 %
EOSINOPHILS RELATIVE PERCENT: 1.7 %
FERRITIN: 23.18 NG/ML (ref 13–200)
HCT VFR BLD CALC: 40.9 % (ref 36–48)
HEMOGLOBIN: 13.5 G/DL (ref 12–16)
IRON SATURATION: 17 % (ref 20–50)
IRON, SERUM: 77 UG/DL (ref 37–145)
LYMPHOCYTE %: 23.8 %
MCH RBC QN AUTO: 26.1 PG (ref 27–34)
MCHC RBC AUTO-ENTMCNC: 33 G/DL (ref 31–36)
MCV RBC AUTO: 79 FL (ref 80–100)
MONOCYTES # BLD: 4.4 %
NEUTROPHILS RELATIVE PERCENT: 68.6 %
PDW BLD-RTO: 12.8 % (ref 10.8–14.8)
PLATELETS: 451 K/UL (ref 150–450)
RBC: 5.18 M/UL (ref 4–5.5)
TOTAL IRON BINDING CAPACITY: 451 MCG/DL (ref 250–450)
TSH SERPL DL<=0.05 MIU/L-ACNC: 0.96 UIU/ML (ref 0.4–4.1)
UNSATURATED IRON BINDING CAPACITY: 374 UG/DL (ref 112–347)
WBC: 8.8 K/UL (ref 3.7–10.8)

## 2018-12-17 ENCOUNTER — TELEPHONE (OUTPATIENT)
Dept: FAMILY MEDICINE CLINIC | Age: 23
End: 2018-12-17

## 2019-06-19 ENCOUNTER — HOSPITAL ENCOUNTER (EMERGENCY)
Age: 24
Discharge: HOME | End: 2019-06-19
Attending: EMERGENCY MEDICINE
Payer: COMMERCIAL

## 2019-06-19 VITALS
HEIGHT: 62 IN | SYSTOLIC BLOOD PRESSURE: 145 MMHG | TEMPERATURE: 98.6 F | OXYGEN SATURATION: 98 % | DIASTOLIC BLOOD PRESSURE: 83 MMHG | WEIGHT: 167 LBS | RESPIRATION RATE: 16 BRPM | BODY MASS INDEX: 30.73 KG/M2 | HEART RATE: 123 BPM

## 2019-06-19 DIAGNOSIS — F43.0 ACUTE STRESS REACTION: Primary | ICD-10-CM

## 2019-06-19 DIAGNOSIS — F32.A ACUTE DEPRESSION: ICD-10-CM

## 2019-06-19 PROCEDURE — 99213 OFFICE O/P EST LOW 20 MIN: CPT

## 2019-06-19 PROCEDURE — 99213 OFFICE O/P EST LOW 20 MIN: CPT | Performed by: EMERGENCY MEDICINE

## 2019-06-19 ASSESSMENT — ENCOUNTER SYMPTOMS
VOMITING: 0
SHORTNESS OF BREATH: 0
EYE REDNESS: 0
COUGH: 0
FACIAL SWELLING: 0
ABDOMINAL PAIN: 0
SINUS PRESSURE: 0
CHOKING: 0
TROUBLE SWALLOWING: 0
BLOOD IN STOOL: 0
BACK PAIN: 0
CONSTIPATION: 0
VOICE CHANGE: 0
WHEEZING: 0
STRIDOR: 0
NAUSEA: 0
EYE PAIN: 0
SORE THROAT: 0
DIARRHEA: 0
EYE DISCHARGE: 0

## 2019-06-19 NOTE — ED PROVIDER NOTES
[address] [facility] Encounter      Jacob Tejeda       Chief Complaint   Patient presents with    Stress Reaction       Nurses Notes reviewed and I agree except as noted in the HPI. HISTORY OF PRESENT ILLNESS   Jenny Aly is a 26 y.o. female who presents with anxiety and depression that has worsened over the last several weeks. .  She has a combination of life stress due to financial problems, difficulty at school, lack of health insurance. She has been seeing a counselor at work. She has experienced depression in the past that was treated with counseling, not medication. She is requesting a statement for school to allow additional time to complete her assignments. She denies suicidal plan or ideation. No street drugs and minimal alcohol. Currently not taking psychiatric medication    REVIEW OF SYSTEMS     Review of Systems   Constitutional: Negative for appetite change, chills, fatigue, fever and unexpected weight change. HENT: Negative for congestion, ear discharge, ear pain, facial swelling, hearing loss, nosebleeds, postnasal drip, sinus pressure, sore throat, trouble swallowing and voice change. Eyes: Negative for pain, discharge, redness and visual disturbance. Respiratory: Negative for cough, choking, shortness of breath, wheezing and stridor. Cardiovascular: Negative for chest pain and leg swelling. Gastrointestinal: Negative for abdominal pain, blood in stool, constipation, diarrhea, nausea and vomiting. Genitourinary: Negative for dysuria, flank pain, frequency, hematuria, urgency, vaginal bleeding and vaginal discharge. Musculoskeletal: Negative for arthralgias, back pain, neck pain and neck stiffness. Skin: Negative for rash. Neurological: Negative for dizziness, seizures, syncope, weakness, light-headedness and headaches. Hematological: Negative for adenopathy. Does not bruise/bleed easily.    Psychiatric/Behavioral: Positive for sleep normal.   Mouth/Throat: Oropharynx is clear and moist. No trismus in the jaw. No uvula swelling. No oropharyngeal exudate, posterior oropharyngeal edema, posterior oropharyngeal erythema or tonsillar abscesses. Eyes: Pupils are equal, round, and reactive to light. EOM are normal. Right eye exhibits no discharge. Left eye exhibits no discharge. Right conjunctiva is injected. Left conjunctiva is injected. No scleral icterus. Right eye exhibits normal extraocular motion. Left eye exhibits normal extraocular motion. Tearful, conjunctiva injected   Neck: Normal range of motion. No JVD present. No thyromegaly present. No meningismus   Cardiovascular: Regular rhythm, S1 normal, S2 normal, normal heart sounds, intact distal pulses and normal pulses. Tachycardia present. Exam reveals no gallop and no friction rub. No murmur heard. Heart rate on my examination 104   Pulmonary/Chest: Effort normal and breath sounds normal. No stridor. No respiratory distress. She has no wheezes. She has no rales. She exhibits no tenderness. No cough, lungs clear   Abdominal: Soft. Bowel sounds are normal. She exhibits no distension and no mass. There is no tenderness. There is no rebound and no guarding. Soft nontender   Musculoskeletal: Normal range of motion. She exhibits no edema or tenderness. Right lower leg: Normal.        Left lower leg: Normal.   Joints normal   Lymphadenopathy:     She has no cervical adenopathy. Right cervical: No superficial cervical and no deep cervical adenopathy present. Left cervical: No superficial cervical and no deep cervical adenopathy present. Neurological: She is alert and oriented to person, place, and time. She has normal reflexes. She is not disoriented. She displays normal reflexes. No cranial nerve deficit. She exhibits normal muscle tone. Coordination normal.   Appropriate, no focal findings,   Skin: Skin is warm and dry. No rash noted. She is not diaphoretic.  No erythema. No laceration or bruising   Psychiatric: Her behavior is normal. Judgment and thought content normal. Her mood appears anxious. Her affect is not angry. She is not actively hallucinating. She exhibits a depressed mood. No psychosis or suicidal ideation. Nursing note and vitals reviewed. DIAGNOSTIC RESULTS   Labs: No results found for this visit on 06/19/19. IMAGING:  No orders to display     URGENT CARE COURSE:     Vitals:    06/19/19 1700   BP: (!) 145/83   Pulse: 123   Resp: 16   Temp: 98.6 °F (37 °C)   SpO2: 98%   Weight: 167 lb (75.8 kg)   Height: 5' 1.5\" (1.562 m)       Medications - No data to display  PROCEDURES:  None  FINALIMPRESSION      1. Acute stress reaction    2. Acute depression        DISPOSITION/PLAN   DISPOSITION Decision To Discharge 06/19/2019 05:37:20 PM  Nontoxic, well-hydrated, normal airway. Patient has acute depression and anxiety. No psychosis. No evidence of suicide risk. Documentation completed to allow her additional time for schoolwork. Patient to follow-up with counselor or PCP tomorrow and she understands to go to ED if worse.   PATIENT REFERRED TO:  RECheck with your counselor or PCP ASAP    Schedule an appointment as soon as possible for a visit in 1 day  Recheck with your counselor or PCP ASAP, go to emergency if worse    DISCHARGE MEDICATIONS:  Discharge Medication List as of 6/19/2019  5:39 PM        Discharge Medication List as of 6/19/2019  5:39 PM          MD Jocelyne Cedeno MD  06/19/19 3359

## 2019-06-19 NOTE — LETTER
[address] [facility]  [address]  [place] [address]  Phone: [phone]               June 19, 2019    Patient: Alanis Qureshi   YOB: 1995   Date of Visit: 6/19/2019       To Whom It May Concern:    Aditya Goodman was seen and treated in our emergency department on 6/19/2019. She may return to school on per couseling recommendation. Valentino Abate evaluated for anxiety and depression at [facility] urgent care 6/19/2019. She is unable to complete school assignments at this time. Please allow extra time to complete school work and school assignments from previous quarter due to anxiety and depression.       Sincerely,       Marquise Thapa MD         Signature:__________________________________

## 2019-06-19 NOTE — ED TRIAGE NOTES
Pt ambulatory to [facility] room 4. Pt crying states she is under a lot of stress right now. States her mother lost her job so pt will no longer have health insurance. Pt is currently moving and  is failing classes at school and is unable to get an incomplete grade unless she has medical documentation that she would like to talk to a . States she is under a lot of stress and she just needs someone to listen to her.